# Patient Record
Sex: FEMALE
[De-identification: names, ages, dates, MRNs, and addresses within clinical notes are randomized per-mention and may not be internally consistent; named-entity substitution may affect disease eponyms.]

---

## 2020-01-01 ENCOUNTER — NURSE TRIAGE (OUTPATIENT)
Dept: OTHER | Facility: CLINIC | Age: 60
End: 2020-01-01

## 2020-05-24 NOTE — TELEPHONE ENCOUNTER
She was in ER 5/20 and was told she had kiedney stones. She passed 2 stones late Friday night, then started running fever up to 104, within an hour. She is on 4 new prescriptions from the ER. One a \"strong\" abx  She does not know why she is on the abx and cannot tell me the names of the other drugs. She does not have her ER paperwork with her so cannot read the instructions, meds or dx to writer. She is states she was told she had one kidney stone. She has passed, two and the report she said she can see in her chart accounts says several.    She is asking if she should stop the meds since she passed two of the stones. Recommend she call her PCP or the ER providers to discuss her home care and follow up. Would continue meds as instructed in ER. Please do not reply to the triage nurse through this encounter. Any subsequent communication should be directly with the patient.      Reason for Disposition   General information question, no triage required and triager able to answer question    Protocols used: INFORMATION ONLY CALL-ADULT-